# Patient Record
Sex: MALE | Race: WHITE | NOT HISPANIC OR LATINO | Employment: FULL TIME | URBAN - METROPOLITAN AREA
[De-identification: names, ages, dates, MRNs, and addresses within clinical notes are randomized per-mention and may not be internally consistent; named-entity substitution may affect disease eponyms.]

---

## 2023-01-30 ENCOUNTER — OFFICE VISIT (OUTPATIENT)
Dept: FAMILY MEDICINE CLINIC | Facility: CLINIC | Age: 28
End: 2023-01-30
Payer: COMMERCIAL

## 2023-01-30 ENCOUNTER — TELEPHONE (OUTPATIENT)
Dept: FAMILY MEDICINE CLINIC | Facility: CLINIC | Age: 28
End: 2023-01-30

## 2023-01-30 VITALS
RESPIRATION RATE: 16 BRPM | OXYGEN SATURATION: 98 % | WEIGHT: 185 LBS | TEMPERATURE: 98 F | HEART RATE: 62 BPM | DIASTOLIC BLOOD PRESSURE: 81 MMHG | HEIGHT: 69 IN | SYSTOLIC BLOOD PRESSURE: 124 MMHG | BODY MASS INDEX: 27.4 KG/M2

## 2023-01-30 DIAGNOSIS — S99.912A INJURY OF LEFT ANKLE, INITIAL ENCOUNTER: ICD-10-CM

## 2023-01-30 DIAGNOSIS — M79.672 LEFT FOOT PAIN: Primary | ICD-10-CM

## 2023-01-30 DIAGNOSIS — S99.922A FOOT INJURY, LEFT, INITIAL ENCOUNTER: ICD-10-CM

## 2023-01-30 DIAGNOSIS — M25.572 CHRONIC PAIN OF LEFT ANKLE: ICD-10-CM

## 2023-01-30 DIAGNOSIS — G89.29 CHRONIC PAIN OF LEFT ANKLE: ICD-10-CM

## 2023-01-30 DIAGNOSIS — M79.89 SWELLING OF LEFT FOOT: ICD-10-CM

## 2023-01-30 PROCEDURE — 99203 OFFICE O/P NEW LOW 30 MIN: CPT | Performed by: NURSE PRACTITIONER

## 2023-01-30 NOTE — TELEPHONE ENCOUNTER
Called Yevgeniy and discussed x-ray results.  Patient reports he is going to [place] in a few weeks and was wondering if he should use a knee scooter or wheelchair.  I did recommend patient to use a knee scooter to stay off his foot.  Patient to continue ice and elevating the left foot.  We will consider podiatry referral if patient's pain not improving.  Patient was instructed to stretch foot.

## 2023-01-30 NOTE — PROGRESS NOTES
"Chief Complaint  Chief Complaint   Patient presents with   • Establish Care   • Ankle Injury     L achilles injury playing volleyball 5 wks ago   • Foot Swelling        Subjective          Yevgeniy Rojas is here today to establish care. The following problems were discussed:     Left foot injury- 5 weeks ago playing volleyball and came down from a spike and hit left ankle into padded medal pole. Has slowly gotten better. Swelling has gone down but pain is still there. Describes the pain as sharp. Movement makes the pain worse. Denies any alleviating factors. Takes ibuprofen for pain. Has been elevating foot and ice foot. Left foot pain currently 4/10 with movement.       He is from this area   Previous PCP: Jose Hernandez   Marital status-   Children- Yes  Works as [employer]   Exercise- regularly 5 times weekly  Diet- Eating well-balanced meals and healthy snacks       Review of Systems   Constitutional: Negative for chills and fever.   HENT: Negative for congestion.    Respiratory: Negative for shortness of breath.    Cardiovascular: Negative for chest pain.   Gastrointestinal: Negative for abdominal pain, nausea and vomiting.   Genitourinary: Negative for difficulty urinating.   Musculoskeletal:        Left foot pain    Skin: Negative.    Neurological: Negative for dizziness, light-headedness and headache.        Objective   Vital Signs:   Vitals:    01/30/23 1006   BP: 124/81   Pulse: 62   Resp: 16   Temp: 98 °F (36.7 °C)   SpO2: 98%      Estimated body mass index is 27.32 kg/m² as calculated from the following:    Height as of this encounter: 175.3 cm (69\").    Weight as of this encounter: 83.9 kg (185 lb).            Physical Exam  Vitals reviewed.   Constitutional:       Appearance: Normal appearance. He is normal weight.   HENT:      Head: Normocephalic and atraumatic.   Eyes:      Extraocular Movements: Extraocular movements intact.      Conjunctiva/sclera: Conjunctivae normal. "   Cardiovascular:      Rate and Rhythm: Normal rate and regular rhythm.      Pulses: Normal pulses.      Heart sounds: Normal heart sounds.      Comments: S1, S2 audible  Pulmonary:      Effort: Pulmonary effort is normal.      Breath sounds: Normal breath sounds.   Abdominal:      General: Abdomen is flat. Bowel sounds are normal.      Palpations: Abdomen is soft.   Musculoskeletal:         General: Swelling present. Normal range of motion.      Cervical back: Normal range of motion.      Comments: Left foot swelling, able to palpate left pedal pulse   Skin:     General: Skin is warm and dry.      Findings: Bruising present.      Comments: Slight bruising noted on left ankle    Neurological:      General: No focal deficit present.      Mental Status: He is alert and oriented to person, place, and time. Mental status is at baseline.   Psychiatric:         Mood and Affect: Mood normal.         Behavior: Behavior normal.         Thought Content: Thought content normal.         Judgment: Judgment normal.                Physical Exam   Result Review :             Procedures       Assessment and Plan     Diagnoses and all orders for this visit:    1. Left foot pain (Primary)  -     XR Foot 3+ View Left (In Office)    2. Swelling of left foot  -     XR Foot 3+ View Left (In Office)    3. Chronic pain of left ankle  -     XR Ankle 3+ View Left (In Office)    4. Injury of left ankle, initial encounter  -     XR Ankle 3+ View Left (In Office)    5. Foot injury, left, initial encounter  Assessment & Plan:  5 weeks ago playing volleyball and came down from a spike and hit left ankle into padded medal pole. Has slowly gotten better. Swelling has gone down but pain is still there. Describes the pain as sharp. Movement makes the pain worse. Denies any alleviating factors. Takes ibuprofen for pain. Has been elevating foot and ice foot. Left foot pain currently 4/10 with movement.     X-ray left foot and ankle ordered    Continue  ibuprofen, ice, and elevated    Continue compression    Consider referral podiatry - pending X-ray               Follow Up   Return in about 3 months (around 4/30/2023) for Annual physical.   Patient was given instructions and counseling regarding her condition or for health maintenance advice. Please see specific information pulled into the AVS if appropriate.

## 2023-01-30 NOTE — ASSESSMENT & PLAN NOTE
5 weeks ago playing volleyball and came down from a spike and hit left ankle into padded medal pole. Has slowly gotten better. Swelling has gone down but pain is still there. Describes the pain as sharp. Movement makes the pain worse. Denies any alleviating factors. Takes ibuprofen for pain. Has been elevating foot and ice foot. Left foot pain currently 4/10 with movement.     X-ray left foot and ankle ordered    Continue ibuprofen, ice, and elevated    Continue compression    Consider referral podiatry - pending X-ray

## 2023-02-10 ENCOUNTER — PATIENT ROUNDING (BHMG ONLY) (OUTPATIENT)
Dept: FAMILY MEDICINE CLINIC | Facility: CLINIC | Age: 28
End: 2023-02-10
Payer: COMMERCIAL